# Patient Record
Sex: MALE | Race: WHITE | NOT HISPANIC OR LATINO | Employment: FULL TIME | URBAN - METROPOLITAN AREA
[De-identification: names, ages, dates, MRNs, and addresses within clinical notes are randomized per-mention and may not be internally consistent; named-entity substitution may affect disease eponyms.]

---

## 2023-12-19 ENCOUNTER — APPOINTMENT (OUTPATIENT)
Dept: PRIMARY CARE | Facility: CLINIC | Age: 28
End: 2023-12-19
Payer: COMMERCIAL

## 2024-08-07 ENCOUNTER — APPOINTMENT (OUTPATIENT)
Dept: DERMATOLOGY | Facility: CLINIC | Age: 29
End: 2024-08-07
Payer: COMMERCIAL

## 2024-08-29 ENCOUNTER — APPOINTMENT (OUTPATIENT)
Dept: DERMATOLOGY | Facility: CLINIC | Age: 29
End: 2024-08-29
Payer: COMMERCIAL

## 2024-08-29 DIAGNOSIS — L71.9 ROSACEA: Primary | ICD-10-CM

## 2024-08-29 PROCEDURE — 99214 OFFICE O/P EST MOD 30 MIN: CPT | Performed by: DERMATOLOGY

## 2024-08-29 RX ORDER — IVERMECTIN 10 MG/G
1 CREAM TOPICAL 2 TIMES DAILY
Qty: 45 G | Refills: 11 | Status: SHIPPED | OUTPATIENT
Start: 2024-08-29

## 2024-08-29 ASSESSMENT — DERMATOLOGY QUALITY OF LIFE (QOL) ASSESSMENT
RATE HOW BOTHERED YOU ARE BY EFFECTS OF YOUR SKIN PROBLEMS ON YOUR ACTIVITIES (EG, GOING OUT, ACCOMPLISHING WHAT YOU WANT, WORK ACTIVITIES OR YOUR RELATIONSHIPS WITH OTHERS): 0 - NEVER BOTHERED
RATE HOW EMOTIONALLY BOTHERED YOU ARE BY YOUR SKIN PROBLEM (FOR EXAMPLE, WORRY, EMBARRASSMENT, FRUSTRATION): 0 - NEVER BOTHERED
DATE THE QUALITY-OF-LIFE ASSESSMENT WAS COMPLETED: 67081
ARE THERE EXCLUSIONS OR EXCEPTIONS FOR THE QUALITY OF LIFE ASSESSMENT: NO
WHAT SINGLE SKIN CONDITION LISTED BELOW IS THE PATIENT ANSWERING THE QUALITY-OF-LIFE ASSESSMENT QUESTIONS ABOUT: ROSACEA
RATE HOW BOTHERED YOU ARE BY SYMPTOMS OF YOUR SKIN PROBLEM (EG, ITCHING, STINGING BURNING, HURTING OR SKIN IRRITATION): 0 - NEVER BOTHERED

## 2024-08-29 ASSESSMENT — DERMATOLOGY PATIENT ASSESSMENT
DO YOU HAVE ANY NEW OR CHANGING LESIONS: NO
ARE YOU AN ORGAN TRANSPLANT RECIPIENT: NO
HAVE YOU HAD OR DO YOU HAVE VASCULAR DISEASE: NO
DO YOU USE SUNSCREEN: OCCASIONALLY
DO YOU USE A TANNING BED: NO
HAVE YOU HAD OR DO YOU HAVE A STAPH INFECTION: NO

## 2024-08-29 ASSESSMENT — ITCH NUMERIC RATING SCALE: HOW SEVERE IS YOUR ITCHING?: 0

## 2024-08-29 ASSESSMENT — PATIENT GLOBAL ASSESSMENT (PGA): PATIENT GLOBAL ASSESSMENT: PATIENT GLOBAL ASSESSMENT:  2 - MILD

## 2024-08-29 NOTE — PROGRESS NOTES
Subjective     Quinten Henson is a 29 y.o. male who presents for the following: Rosacea.     Rosacea  Symptoms have been ongoing for about several years.  The skin changes are primarily located on the face. His skin changes are described as red bumps. Flushing of his face occurs occasionally. Previous treatment has included metronidazole with not much improvement. He states that he sometimes uses the cream twice a day but sometimes doesn't.    Review of Systems:  No other skin or systemic complaints other than what is documented elsewhere in the note.    The following portions of the chart were reviewed this encounter and updated as appropriate:       Skin Cancer History  No skin cancer on file.    Specialty Problems    None    Past Medical History:  Quinten Henson  has no past medical history on file.    Past Surgical History:  Quinten Henson  has a past surgical history that includes Other surgical history (12/09/2021); Other surgical history (12/09/2021); Other surgical history (12/09/2021); and Other surgical history (08/29/2022).    Family History:  Patient family history is not on file.       Objective   Well appearing patient in no apparent distress; mood and affect are within normal limits.    A focused skin examination was performed. All findings within normal limits unless otherwise noted below.    Assessment/Plan   1. Rosacea  Head - Anterior (Face)  Mid face erythema with telangiectasias and inflammatory papules.    - Continue metronidazole cream BID at this time while Soolantra is processed   - Patient has not seen significant improvement with oral Doxycycline (GI side effects), oral minocycline (despite months of use), and topical metronidazole. Therefore, will try to start topical Soolantra at this time and assess response  - Start Soolantra cream BID; ordered    ivermectin 1 % cream - Head - Anterior (Face)  Apply 1 Application topically 2 times a day.      Follow up as needed.    Lamine Lezama,  How Severe Is Your Skin Lesion?: mild MD  Department of Dermatology    I saw and evaluated the patient. I personally obtained the key and critical portions of the history and physical exam or was physically present for key and critical portions performed by the student/resident. I reviewed the student/resident's documentation and discussed the patient with the student/resident. I was present for the entirety of any procedure(s). I agree with the student/resident's medical decision making as documented in the note.

## 2024-12-02 ENCOUNTER — APPOINTMENT (OUTPATIENT)
Dept: DERMATOLOGY | Facility: CLINIC | Age: 29
End: 2024-12-02
Payer: COMMERCIAL

## 2024-12-02 DIAGNOSIS — L71.9 ROSACEA: Primary | ICD-10-CM

## 2024-12-02 PROCEDURE — 99214 OFFICE O/P EST MOD 30 MIN: CPT | Performed by: DERMATOLOGY

## 2024-12-02 RX ORDER — SULFACETAMIDE SODIUM, SULFUR 80; 40 MG/G; MG/G
1 LIQUID TOPICAL DAILY
Qty: 454 G | Refills: 2 | Status: SHIPPED | OUTPATIENT
Start: 2024-12-02

## 2024-12-02 RX ORDER — MINOCYCLINE HYDROCHLORIDE 100 MG/1
100 CAPSULE ORAL 2 TIMES DAILY
Qty: 60 CAPSULE | Refills: 4 | Status: SHIPPED | OUTPATIENT
Start: 2024-12-02 | End: 2025-03-02

## 2024-12-02 NOTE — PROGRESS NOTES
Subjective     Quinten Henson is a 29 y.o. male who presents for the following: Rosacea (Follow up for Rosacea. Pt currently using Soolantra BID and Metronidazole Cream BID. ).     Rosacea  Symptoms have been ongoing for about a few years.  The skin changes are primarily located on the face. His skin changes are described as redness and flushing. Flushing of his face occurs constantly. Previous treatment has included  soolantra  with inadequate improvement.        Review of Systems:  No other skin or systemic complaints other than what is documented elsewhere in the note.    The following portions of the chart were reviewed this encounter and updated as appropriate:       Skin Cancer History  No skin cancer on file.    Specialty Problems    None    Past Medical History:  Quinten Henson  has no past medical history on file.    Past Surgical History:  Quinten Henson  has a past surgical history that includes Other surgical history (12/09/2021); Other surgical history (12/09/2021); Other surgical history (12/09/2021); and Other surgical history (08/29/2022).    Family History:  Patient family history is not on file.       Objective   Well appearing patient in no apparent distress; mood and affect are within normal limits.    A focused skin examination was performed. All findings within normal limits unless otherwise noted below.    Assessment/Plan   1. Rosacea  Head - Anterior (Face)  Mid face erythema with telangiectasias and inflammatory papules.    - Patient has not seen significant improvement with oral Doxycycline (GI side effects), oral minocycline (despite months of use), and topical metronidazole.   - Currently on Soolantra cream BID, minimal improvement  - Discussed with patient regarding restarting minocycline to obtain clearance before continuing with topical regimen for maintenance   - START minocycline 100 mg BID for 3 months. If clearing after 3 months, can decrease to once per day for 1 month then  decrease to every other day until clear. If not clear with decreasing dosage, will consider possible isotretinoin  - CONTINUE with Soolantra topical cream daily  - START sulfacetamide-sulfur cleanser once daily. No sulfur allergies per patient    Related Medications  ivermectin 1 % cream  Apply 1 Application topically 2 times a day.    minocycline 100 mg capsule  Take 1 capsule (100 mg) by mouth 2 times a day.    sulfacetamide sodium-sulfur 8-4 % cleanser  Apply 1 Application topically once daily.        Celestino Hamm MD  PGY4 Dermatology    I saw and evaluated the patient. I personally obtained the key and critical portions of the history and physical exam or was physically present for key and critical portions performed by the student/resident. I reviewed the student/resident's documentation and discussed the patient with the student/resident. I was present for the entirety of any procedure(s). I agree with the student/resident's medical decision making as documented in the note.

## 2025-04-07 ENCOUNTER — APPOINTMENT (OUTPATIENT)
Dept: DERMATOLOGY | Facility: CLINIC | Age: 30
End: 2025-04-07
Payer: COMMERCIAL

## 2025-04-07 DIAGNOSIS — L71.9 ROSACEA: Primary | ICD-10-CM

## 2025-04-07 PROCEDURE — 99214 OFFICE O/P EST MOD 30 MIN: CPT | Performed by: DERMATOLOGY

## 2025-04-07 RX ORDER — DOXYCYCLINE 100 MG/1
100 CAPSULE ORAL 2 TIMES DAILY
Qty: 60 CAPSULE | Refills: 2 | Status: SHIPPED | OUTPATIENT
Start: 2025-04-07 | End: 2025-05-07

## 2025-04-07 NOTE — PROGRESS NOTES
Subjective     Quinten Henson is a 29 y.o. male who presents for the following: Rosacea (Follow up. Located to face. Pt has been using Soolantra 1% cream, sulfacetamide-sulfur 8-4% cleanser, and minocycline 100mg. Pt is noting small improvements ).     Rosacea  Symptoms have been ongoing for about 2 years.  The skin changes are primarily located on the face. His skin changes are described as red bumps and redness. Flushing of his face occurs with alcohol consumption. Previous treatment has included Soolantra 1% cream, sulfacetamide-sulfur 8-4% cleanser, and minocycline 100mg with fair improvement.    Review of Systems:  No other skin or systemic complaints other than what is documented elsewhere in the note.    The following portions of the chart were reviewed this encounter and updated as appropriate:       Skin Cancer History  No skin cancer on file.    Specialty Problems    None    Past Medical History:  Quinten Henson  has no past medical history on file.    Past Surgical History:  Quinten Henson  has a past surgical history that includes Other surgical history (12/09/2021); Other surgical history (12/09/2021); Other surgical history (12/09/2021); and Other surgical history (08/29/2022).    Family History:  Patient family history is not on file.       Objective   Well appearing patient in no apparent distress; mood and affect are within normal limits.    A focused skin examination was performed. All findings within normal limits unless otherwise noted below.    Assessment/Plan   1. Rosacea  Head - Anterior (Face)  Mid face erythema with telangiectasias and inflammatory papules.    - Patient has not seen significant improvement with oral Doxycycline (GI side effects), oral minocycline (despite months of use), and topical metronidazole.   - Currently on Soolantra cream BID, minimal improvement  - Currently on minocycline 100 mg every other day. Completed 3 month course of 100 mg BID  - CONTINUE  sulfacetamide-sulfur cleanser once daily. No sulfur allergies per patient  - CONTINUE Soolantra cream BID  - Patient has currently been on minocycline for 4-5 months to date. Discussed switching back to doxycycline as patient notes he seemed to have better results while on doxycycline   - START doxycyline 100 mg BID. Discussed RBE with patient inculding not taking with dairy products, sun protection    RTC in 3 months      Related Medications  ivermectin 1 % cream  Apply 1 Application topically 2 times a day.    sulfacetamide sodium-sulfur 8-4 % cleanser  Apply 1 Application topically once daily.    doxycycline (Vibramycin) 100 mg capsule  Take 1 capsule (100 mg) by mouth 2 times a day. Take with at least 8 ounces (large glass) of water, do not lie down for 30 minutes after      Celestino Hamm MD  PGY4 Dermatology    I saw and evaluated the patient. I personally obtained the key and critical portions of the history and physical exam or was physically present for key and critical portions performed by the student/resident. I reviewed the student/resident's documentation and discussed the patient with the student/resident. I was present for the entirety of any procedure(s). I agree with the student/resident's medical decision making as documented in the note.

## 2025-07-14 ENCOUNTER — APPOINTMENT (OUTPATIENT)
Dept: DERMATOLOGY | Facility: CLINIC | Age: 30
End: 2025-07-14
Payer: COMMERCIAL

## 2026-03-11 ENCOUNTER — APPOINTMENT (OUTPATIENT)
Dept: DERMATOLOGY | Facility: CLINIC | Age: 31
End: 2026-03-11
Payer: COMMERCIAL